# Patient Record
(demographics unavailable — no encounter records)

---

## 2024-11-11 NOTE — DISCUSSION/SUMMARY
[de-identified] : This patient presents today for evaluation regarding complaints of bilateral shoulder pain right worse than the left.  Physical dam reveals evidence of osteoarthritis about the right greater than left shoulder.  I discussed the diagnosis with the patient and her daughter at length.  I did recommend a steroid injection to the right shoulder to performed on today's visit.  Instructions are given postinjection for ice analgesics and modification of activities.  I like to see her back in 2 to 3 weeks for follow-up and reevaluation.  At that point we can determine whether or not she would benefit from an injection into the left shoulder.  At least 40 minutes was spent performing the evaluation and management on today's office visit.  This includes but is not limited to preparing to see patient including review of any test results or outside medical records, obtaining and/or reviewing separately obtained history, performing examination and evaluation, counseling and educating the patient on their diagnosis and treatment recommendations, ordering medications, tests, or procedures, documenting clinical information in the electronic health record, independently interpreting results (not separately reported) and communicating results to the patient, and coordination of care.

## 2024-11-11 NOTE — PROCEDURE
[de-identified] : Using sterile technique, 2cc of depomedrol (40mg/ml) and 3cc of 1% plain lidocaine was drawn up into a sterile 5cc syringe.  The posterior lateral corner of the right shoulder was then sterilely prepped with chlorhexidine and ethylene chloride spray was used as an anesthetic prior to injection.  The subacromial space was imaged with the [employer] Lumify ultrasound probe.  The depomedrol/lidocaine mixture was injected into the subacromial space under ultrasound guidance.  A spot image of the injection was saved..  The patient tolerated the procedure well without difficulty.  The patient was given instructions on the use of ice and anti-inflammatories post injection site soreness.

## 2024-11-11 NOTE — PHYSICAL EXAM
[de-identified] : The patient appears well nourished  and in no apparent distress.  The patient is alert and oriented to person, place, and time.   Affect and mood appear normal.    The head is normocephalic and atraumatic.  The eyes reveal normal sclera and extra ocular muscles are intact.   The neck appears normal with no jugular venous distention or masses noted.   Skin shows normal turgor with no evidence of eczema or psoriasis.  No respiratory distress noted.  The patient ambulates with a normal gait.  The right and left shoulders have decreased range of motion the right moderate and the left mild.  There is pain with terminal motion of the right shoulder greater than left.  Positive Huitron sign and positive impingement sign of the right shoulder.  There is no tenderness to palpation.    Rotator cuff strength is normal.    There is no soft tissue swelling.  There is no eccyhmosis.  There is no warmth or erythema.    There is no instabililty on exam.  No lymphadenopathy or edema is noted.  Pulses and capillary refill are normal.  There is no muscular atrophy.  Strength and sensation are intact distally.   [de-identified] : AP, outlet,  and glenoid and axillary views of the right and left shoulders were obtained.  There is inferior humeral head osteophytes bilaterally on the shoulders.  The axillary view of the right shoulder reveals significant joint space narrowing.   There is no fracture, dislocation, or subluxation.
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
